# Patient Record
Sex: FEMALE | Race: WHITE | NOT HISPANIC OR LATINO | URBAN - METROPOLITAN AREA
[De-identification: names, ages, dates, MRNs, and addresses within clinical notes are randomized per-mention and may not be internally consistent; named-entity substitution may affect disease eponyms.]

---

## 2024-08-12 ENCOUNTER — HOSPITAL ENCOUNTER (EMERGENCY)
Facility: HOSPITAL | Age: 54
Discharge: HOME | End: 2024-08-12
Payer: COMMERCIAL

## 2024-08-12 VITALS
DIASTOLIC BLOOD PRESSURE: 66 MMHG | OXYGEN SATURATION: 100 % | SYSTOLIC BLOOD PRESSURE: 108 MMHG | TEMPERATURE: 98.1 F | HEART RATE: 63 BPM | HEIGHT: 65 IN | RESPIRATION RATE: 18 BRPM

## 2024-08-12 DIAGNOSIS — H61.21 IMPACTED CERUMEN OF RIGHT EAR: ICD-10-CM

## 2024-08-12 DIAGNOSIS — S09.90XA HEAD INJURY, INITIAL ENCOUNTER: Primary | ICD-10-CM

## 2024-08-12 PROCEDURE — 69210 REMOVE IMPACTED EAR WAX UNI: CPT | Mod: RT

## 2024-08-12 PROCEDURE — 99283 EMERGENCY DEPT VISIT LOW MDM: CPT | Mod: 25

## 2024-08-12 RX ORDER — CIPROFLOXACIN AND DEXAMETHASONE 3; 1 MG/ML; MG/ML
4 SUSPENSION/ DROPS AURICULAR (OTIC) 2 TIMES DAILY
Qty: 7.5 ML | Refills: 0 | Status: SHIPPED | OUTPATIENT
Start: 2024-08-12 | End: 2024-08-19

## 2024-08-12 ASSESSMENT — COLUMBIA-SUICIDE SEVERITY RATING SCALE - C-SSRS
1. IN THE PAST MONTH, HAVE YOU WISHED YOU WERE DEAD OR WISHED YOU COULD GO TO SLEEP AND NOT WAKE UP?: NO
2. HAVE YOU ACTUALLY HAD ANY THOUGHTS OF KILLING YOURSELF?: NO
6. HAVE YOU EVER DONE ANYTHING, STARTED TO DO ANYTHING, OR PREPARED TO DO ANYTHING TO END YOUR LIFE?: NO

## 2024-08-12 ASSESSMENT — PAIN SCALES - GENERAL: PAINLEVEL_OUTOF10: 0 - NO PAIN

## 2024-08-12 ASSESSMENT — PAIN - FUNCTIONAL ASSESSMENT: PAIN_FUNCTIONAL_ASSESSMENT: 0-10

## 2024-08-12 NOTE — ED TRIAGE NOTES
Pt was at [employer], walked into a sign on saturday, hit head, -LOC, -thinners, pt now complaining of headache and right ear pain/fullness

## 2024-08-12 NOTE — ED PROVIDER NOTES
HPI   Chief Complaint   Patient presents with    Head Injury       HPI  HISTORY OF PRESENT ILLNESS:  53 y.o. female who presents to the ED with complaint of a head injury that occurred 3 days ago, as well as right ear discomfort, fullness, and decreased hearing.  Patient states that she was at [employer] 3 days ago when she accidentally walked into a sign and bumped her forehead.  She states it was a very mild injury, she did not lose consciousness. Event was witnessed, and confirm that patient did not lose consciousness and had no seizure-like activity. Had a generalized aching headache that day and the following morning which has now completely resolved.  Not the worst headache of her life, not thunderclap in onset.  Had no swelling or hematoma over the scalp or face.  Had no wounds or bleeding.  She states she boarded the plane yesterday to come home, and she began having right ear discomfort, fullness, and occasional tinnitus.  Not pulsatile.  She states the ear feels very full, and states that her hearing is muffled on that side.  She is concerned because it was the right side of her head that she hit on the pole, and wonders if this could be related to the injury.  She denies significant pain in the ear, discomfort and fullness feeling.  Did not fall to the ground, did not sustain any other injuries, has no pain in any other body parts.  She denies dizziness or lightheadedness, no syncope.  No neck pain or stiffness.  No chest pain or shortness of breath.  No fevers or chills.  No abdominal pain. No confusion. Denies amnesia. No nausea or vomiting. Denies weakness, numbness, or tingling of extremities.  No gait instability.  No blurry vision or vision loss. No use of anticoagulants or antiplatelets. No history of bleeding or clotting disorders.  No medications or remedies tried for her symptoms.  No other complaints or symptoms voiced.    PMH: reviewed  Family history: noncontributory  Social history: non  smoker, no ETOH, no illicit substances    12 point review of systems was performed and is negative unless otherwise specified in HPI.    PHYSICAL EXAM:   General: Vital signs stable. Alert & oriented.  No acute distress. Well nourished. Well hydrated. GCS=15  Neuro: Cranial nerves grossly intact. Normal gait. No motor or sensory changes. Appropriate and equal sensation and strength bilaterally.  No focal findings identified.  HENMT: No hematoma, abrasion, or ecchymosis. No Raccoon eyes, No Elise sign. Facial bones and skull nontender, no step-offs or crepitus. Mucous membranes moist. No pharyngeal erythema, uvula midline, teeth intact. Trachea is midline. No meningeal signs, moves neck freely. No C-spine tenderness, normal head and neck range of motion.  Ears: Left ear WNL, normal TM and ear canal, some cerumen present, no impaction, normal hearing. No mastoid tenderness bilaterally.  No hemotympanums bilaterally.  Right ear: +cerumen impaction present.  Subjectively diminished hearing.  After cerumen removal with irrigation and curette, hearing normalized, discomfort completely resolved, the now partially visualized tympanic membrane is normal, however unable to fully visualize the tympanic membrane. The now visible inner ear canal is erythematous and irritated, no edema, no exudates, no purulence. No pain with manipulation of the tragus or pinna. No mastoid tenderness.  Eyes: PERRL, EOMs intact and nonpainful. No nystagmus. Visual fields intact bilaterally. Negative test of skew. Conjunctiva clear with no redness.  Cardiac: Sinus rhythm.  Pulmonary: No respiratory distress, normal respiratory effort. No accessory muscle use. CTAB. Chest wall nontender to palpation.  Abdominal: Soft and nontender.  Skin: Intact, warm, and dry. No lesions, rash, petechiae, or purpura.  MSK: full range of motion of upper and lower extremities. No tenderness over the midline cervical, thoracic, or lumbar spine. Extremities are warm  and well perfused, no cyanosis, no edema.    Patient History   History reviewed. No pertinent past medical history.  History reviewed. No pertinent surgical history.  No family history on file.  Social History     Tobacco Use    Smoking status: Not on file    Smokeless tobacco: Not on file   Substance Use Topics    Alcohol use: Not on file    Drug use: Not on file       Physical Exam   ED Triage Vitals [08/12/24 1137]   Temperature Heart Rate Resp BP   36.7 °C (98.1 °F) 63 -- 108/66      Pulse Ox Temp Source Heart Rate Source Patient Position   100 % Temporal -- --      BP Location FiO2 (%)     -- --       Physical Exam    ED Course & MDM   Diagnoses as of 08/12/24 1339   Head injury, initial encounter   Impacted cerumen of right ear     Medical Decision Making  ED course / MDM     Summary:  Patient presented with a head injury, and right ear fullness, discomfort, and decreased hearing.  Vital signs are stable, patient is very well-appearing.  Ambulates unassisted, no acute distress.  There is no evidence of trauma to the head or face, no scalp hematoma, no wounds or bruising, no tenderness throughout the scalp or face.  Neurologic exam completely normal, no motor or sensory deficits, no ataxia, normal visual fields and EOMs.  Has no pain, declined any pain medications initially ordered a CT head due to her complaint of a head injury.  Left ear within normal limits.  There is a cerumen impaction present in the right ear, was removed via irrigation and curette, and after removal her hearing completely normalized and her symptoms completely resolved.  She has no fullness or discomfort, and states she feels completely improved.  The partially visualized tympanic membrane normal, no obvious perforation.  The nail visualized in her ear canal does appear erythematous and irritated, no edema, no pain with manipulation of the tragus or pinna.   After cerumen removal, patient does not wish to wait for CT scan of the head.   She states her symptoms have completely resolved and she does not feel it is indicated as she feels it is not related to the head injury.  I do agree with this, she has a cerumen impaction that was the cause of her ear symptoms, very unlikely related to head injury.  Head injury was very low risk, and patient scored low risk on both the [place] head CT score and the [place] head CT score, both of which do not recommend CT head imaging for this injury.  Reasonable to not perform CT head today.  We discussed concussion precautions in detail, and reasons to return to the ED that may require CT imaging, signs and symptoms of significant brain injury.  Currently has a very reassuring exam, no neurologic deficits, no sign of significant skull trauma.  Prescription sent for Ciprodex drops as she does have some underlying irritation of the ear canal, also prescribed Debrox drops for earwax.  Referral order placed to ENT, understands need to follow-up with ENT for further evaluation and management of her symptoms.  Also advised to follow-up with her PCP within the next 2 to 3 days for repeat exam.  Patient is eager for discharge.  Stable for discharge with close outpatient follow-up. Patient was given strict return precautions, understands reasons to return to the ED. Also discussed supportive care instructions. I expressed the importance of outpatient follow up with their PCP. All questions were answered, patient expressed understanding and stated that they would comply.    I have discussed with the patient and/or family my clinical impression and the result of an evidence-based clinical evaluation to screen for significant intracranial injury, as well as the risk of further testing. The evidence shows that the risk for intracranial injury requiring surgical intervention is well below 1%. Although the risk of intracranial injury has not been completely eliminated, the risks of further testing or being hospitalized for  intracranial injury likely exceed any potential benefit, and the patient and/or family agrees with not pursuing further emergent evaluation or being hospitalized for intracranial injury at this time.     Impression:  1. See diagnosis    Plan: Homegoing. I discussed the differential, results, and discharge plan with the patient and family/friend/caregiver. Patient was advised to follow up with PCP or recommended provider in 2-3 days for another evaluation and exam. I emphasized the importance of follow-up with the physician I referred them to in the timeframe recommended.  I explained reasons for the patient to return to the Emergency Department. They agreed that if they feel their condition is worsening,  if symptoms change, get worse, new symptoms develop prior to follow up, or if they have any other concern they should call 911 immediately for further assistance. If there is no improvement in symptoms in the next 24 hours they are advised to return for further evaluation and exam. I also explained the plan and treatment course. We also discussed medications that were prescribed including common side effects and interactions. The patient was advised to abstain from driving, operating heavy machinery, or making significant decisions while taking medications such as opiates and muscle relaxers that may impair this. I gave the patient an opportunity to ask all questions they had and answered all of them accordingly. They understand return precautions and discharge instructions. Patient and family/friend/caregiver/guardian is in agreement with plan, treatment course, and follow up and states verbally that they will comply.     Disposition: Discharge    This note has been transcribed using voice recognition and may contain grammatical errors, misplaced words, incorrect words, incorrect phrases or other errors.   Procedure  Ear Cerumen Removal    Performed by: Taamra Caputo PA-C  Authorized by: Tamara Caputo PA-C     Consent:     Consent obtained:  Verbal    Consent given by:  Patient    Risks, benefits, and alternatives were discussed: yes      Risks discussed:  Bleeding, infection, pain, TM perforation, incomplete removal and dizziness    Alternatives discussed:  No treatment, delayed treatment, alternative treatment, observation and referral  Universal protocol:     Procedure explained and questions answered to patient or proxy's satisfaction: yes      Patient identity confirmed:  Verbally with patient  Procedure details:     Location:  R ear    Procedure type: curette      Procedure type comment:  Irrigation and curette    Procedure outcomes: cerumen removed    Post-procedure details:     Inspection:  No bleeding and some cerumen remaining (some erythema of ear canal)    Hearing quality:  Normal    Procedure completion:  Tolerated well, no immediate complications       Tamara Caputo PA-C  08/12/24 6843

## 2024-10-15 ENCOUNTER — HOSPITAL ENCOUNTER (OUTPATIENT)
Dept: RADIOLOGY | Facility: CLINIC | Age: 54
Discharge: HOME | End: 2024-10-15
Payer: COMMERCIAL

## 2024-10-15 ENCOUNTER — APPOINTMENT (OUTPATIENT)
Dept: ORTHOPEDIC SURGERY | Facility: CLINIC | Age: 54
End: 2024-10-15
Payer: COMMERCIAL

## 2024-10-15 DIAGNOSIS — M19.049 CMC ARTHRITIS: Primary | ICD-10-CM

## 2024-10-15 DIAGNOSIS — M79.644 PAIN OF RIGHT THUMB: ICD-10-CM

## 2024-10-15 PROCEDURE — 73140 X-RAY EXAM OF FINGER(S): CPT | Mod: RIGHT SIDE | Performed by: RADIOLOGY

## 2024-10-15 PROCEDURE — 73140 X-RAY EXAM OF FINGER(S): CPT | Mod: RT

## 2024-10-15 PROCEDURE — 1036F TOBACCO NON-USER: CPT | Performed by: ORTHOPAEDIC SURGERY

## 2024-10-15 PROCEDURE — 99203 OFFICE O/P NEW LOW 30 MIN: CPT | Performed by: ORTHOPAEDIC SURGERY

## 2024-10-15 ASSESSMENT — PAIN SCALES - GENERAL: PAINLEVEL_OUTOF10: 3

## 2024-10-15 ASSESSMENT — PAIN - FUNCTIONAL ASSESSMENT: PAIN_FUNCTIONAL_ASSESSMENT: 0-10

## 2024-10-15 NOTE — PROGRESS NOTES
History of Present Illness:  Chief Complaint   Patient presents with    Right Thumb - Pain    Right Hand - Pain       The patient presents today for evaluation of right basilar thumb pain.  Symptoms began about 1 year ago and gradually worsening.  Pain is worse with gripping, pinching and twisting.  The following treatments have been attempted: Activity modifications.  No numbness or tingling.      History reviewed. No pertinent past medical history.    Medication Documentation Review Audit       Reviewed by Deena Mattson CMA (Medical Assistant) on 10/15/24 at 0945      Medication Order Taking? Sig Documenting Provider Last Dose Status            No Medications to Display                                   No Known Allergies    Social History     Socioeconomic History    Marital status:      Spouse name: Not on file    Number of children: Not on file    Years of education: Not on file    Highest education level: Not on file   Occupational History    Not on file   Tobacco Use    Smoking status: Never    Smokeless tobacco: Never   Substance and Sexual Activity    Alcohol use: Not Currently    Drug use: Never    Sexual activity: Defer   Other Topics Concern    Not on file   Social History Narrative    Not on file     Social Determinants of Health     Financial Resource Strain: Not on file   Food Insecurity: Not on file   Transportation Needs: Not on file   Physical Activity: Not on file   Stress: Not on file   Social Connections: Not on file   Intimate Partner Violence: Not on file   Housing Stability: Not on file       History reviewed. No pertinent surgical history.       Review of Systems   GENERAL: Negative for malaise, significant weight loss, fever  MUSCULOSKELETAL: see HPI  NEURO:  see HPI     Physical Examination:  Constitutional: Appears well-developed and well-nourished.  Head: Normocephalic and atraumatic.  Eyes: EOMI grossly  Cardiovascular: Intact distal pulses.   Respiratory: Effort normal. No  respiratory distress.  Neurologic: Alert and oriented to person, place, and time.  Skin: Skin is warm and dry.  Hematologic / Lymphatic: No lymphedema, lymphangitis.  Psychiatric: normal mood and affect. Behavior is normal.   Musculoskeletal:  right wrist/hand:  No obvious swelling or masses  No thenar atrophy  No atrophy noted of hypothenar eminence   Negative Angi's/Phalens  Sensation grossly intact to all digits  5/5 thumb Abduction/Finger Abduction  Tenderness about thumb cmc joint with positive CMC grind.  No tenderness about first dorsal compartment/thumb A1 pulley region  Radial pulse palpable  Good capillary refill     Radiographs: Right thumb radiographs ordered and available for my review/interpretation demonstrate thumb CMC degenerative changes.  No fracture/dislocation.     Assessment:  Patient with right thumb basilar joint arthritis.     Plan:  Diagnosis was reviewed with patient.  We discussed treatments of thumb CMC arthritis.  Non-operative modalities include symptomatic splinting, anti-inflammatories, activity modifications, hand therapy and corticosteroid injections.  We also discussed role of surgical intervention if conservative measures prove unsuccessful.    She will begin with bracing and topical anti-inflammatories as needed.  She will follow-up if persistent symptoms or if she wishes to pursue additional treatment options.

## 2024-12-17 ENCOUNTER — APPOINTMENT (OUTPATIENT)
Dept: ORTHOPEDIC SURGERY | Facility: CLINIC | Age: 54
End: 2024-12-17
Payer: COMMERCIAL

## 2024-12-17 DIAGNOSIS — M19.049 CMC ARTHRITIS: Primary | ICD-10-CM

## 2024-12-17 PROCEDURE — 99214 OFFICE O/P EST MOD 30 MIN: CPT | Performed by: ORTHOPAEDIC SURGERY

## 2024-12-17 RX ORDER — LEVOTHYROXINE SODIUM 100 UG/1
100 TABLET ORAL
COMMUNITY
Start: 2024-09-10 | End: 2025-09-10

## 2024-12-17 ASSESSMENT — PAIN - FUNCTIONAL ASSESSMENT: PAIN_FUNCTIONAL_ASSESSMENT: 0-10

## 2024-12-17 ASSESSMENT — PAIN SCALES - GENERAL: PAINLEVEL_OUTOF10: 4

## 2024-12-18 PROCEDURE — 20600 DRAIN/INJ JOINT/BURSA W/O US: CPT | Performed by: ORTHOPAEDIC SURGERY

## 2024-12-18 RX ORDER — LIDOCAINE HYDROCHLORIDE 10 MG/ML
0.5 INJECTION, SOLUTION INFILTRATION; PERINEURAL
Status: COMPLETED | OUTPATIENT
Start: 2024-12-18 | End: 2024-12-18

## 2024-12-18 RX ORDER — TRIAMCINOLONE ACETONIDE 40 MG/ML
20 INJECTION, SUSPENSION INTRA-ARTICULAR; INTRAMUSCULAR
Status: COMPLETED | OUTPATIENT
Start: 2024-12-18 | End: 2024-12-18

## 2024-12-18 NOTE — PROGRESS NOTES
History of Present Illness:  Chief Complaint   Patient presents with    Right Hand - Follow-up     Right thumb CMC arthritis     Patient last seen 2 months ago for evaluation of right thumb CMC arthritis.  She has been attempting treatment with Voltaren gel as well as bracing.  She has had continued symptoms that are worse with increased activity.  This is affecting her regular activities of daily living, particularly bothersome with certain exercises.    History reviewed. No pertinent past medical history.    Medication Documentation Review Audit       Reviewed by Deena Mattson CMA (Medical Assistant) on 12/17/24 at 0948      Medication Order Taking? Sig Documenting Provider Last Dose Status            No Medications to Display                                   No Known Allergies    Social History     Socioeconomic History    Marital status:      Spouse name: Not on file    Number of children: Not on file    Years of education: Not on file    Highest education level: Not on file   Occupational History    Not on file   Tobacco Use    Smoking status: Never    Smokeless tobacco: Never   Substance and Sexual Activity    Alcohol use: Not Currently    Drug use: Never    Sexual activity: Defer   Other Topics Concern    Not on file   Social History Narrative    Not on file     Social Drivers of Health     Financial Resource Strain: Low Risk  (12/16/2024)    Received from [facility]    Overall Financial Resource Strain (CARDIA)     Difficulty of Paying Living Expenses: Not hard at all   Food Insecurity: No Food Insecurity (12/16/2024)    Received from [facility]    Hunger Vital Sign     Worried About Running Out of Food in the Last Year: Never true     Ran Out of Food in the Last Year: Never true   Transportation Needs: No Transportation Needs (12/16/2024)    Received from [facility]    PRAPARE - Transportation     Lack of Transportation (Medical): No     Lack of Transportation (Non-Medical): No    Physical Activity: Sufficiently Active (12/16/2024)    Received from [facility]    Exercise Vital Sign     Days of Exercise per Week: 7 days     Minutes of Exercise per Session: 50 min   Stress: No Stress Concern Present (12/16/2024)    Received from [facility]    [employer] [place] [employer] - Occupational Stress Questionnaire     Feeling of Stress : Not at all   Social Connections: Moderately Isolated (12/16/2024)    Received from [facility]    Social Connection and Isolation Panel [NHANES]     Frequency of Communication with Friends and Family: More than three times a week     Frequency of Social Gatherings with Friends and Family: More than three times a week     Attends Hinduism Services: Never     Active Member of Clubs or Organizations: No     Attends Club or Organization Meetings: Never     Marital Status:    Intimate Partner Violence: Not on file   Housing Stability: Unknown (12/16/2024)    Received from [facility]    Housing Stability Vital Sign     Unable to Pay for Housing in the Last Year: No     Number of Times Moved in the Last Year: Not on file     Homeless in the Last Year: Not on file       History reviewed. No pertinent surgical history.       Review of Systems   GENERAL: Negative for malaise, significant weight loss, fever  MUSCULOSKELETAL: see HPI  NEURO:  see HPI     Physical Examination:  Constitutional: Appears well-developed and well-nourished.  Head: Normocephalic and atraumatic.  Eyes: EOMI grossly  Cardiovascular: Intact distal pulses.   Respiratory: Effort normal. No respiratory distress.  Neurologic: Alert and oriented to person, place, and time.  Skin: Skin is warm and dry.  Hematologic / Lymphatic: No lymphedema, lymphangitis.  Psychiatric: normal mood and affect. Behavior is normal.   Musculoskeletal:  right wrist/hand:  No obvious swelling or masses  No thenar atrophy  No atrophy noted of hypothenar eminence   Negative  Angi's/Phalens  Sensation grossly intact to all digits  5/5 thumb Abduction/Finger Abduction  Tenderness about thumb cmc joint with positive CMC grind.  No tenderness about first dorsal compartment/thumb A1 pulley region  Radial pulse palpable  Good capillary refill     Assessment:  Patient with right thumb basilar joint arthritis.     Plan:  Diagnosis was reviewed with patient.  We again discussed treatments of thumb CMC arthritis.  Non-operative modalities include symptomatic splinting, anti-inflammatories, activity modifications, hand therapy and corticosteroid injections.  We also discussed role of surgical intervention if conservative measures prove unsuccessful.    She would like to proceed with corticosteroid injection at this time.  She will return if persistent/recurrent symptoms.    Patient ID: Bess Larsen is a 54 y.o. female.    S Inj/Asp: R thumb CMC on 12/18/2024 6:23 AM  Indications: pain  Details: 25 G needle (dorsoradial) approach  Medications: 20 mg triamcinolone acetonide 40 mg/mL; 0.5 mL lidocaine 10 mg/mL (1 %)  Outcome: tolerated well, no immediate complications  Procedure, treatment alternatives, risks and benefits explained, specific risks discussed. Consent was given by the patient. Immediately prior to procedure a time out was called to verify the correct patient, procedure, equipment, support staff and site/side marked as required. Patient was prepped and draped in the usual sterile fashion.

## 2025-02-06 ENCOUNTER — TELEPHONE (OUTPATIENT)
Dept: OPHTHALMOLOGY | Facility: CLINIC | Age: 55
End: 2025-02-06
Payer: COMMERCIAL